# Patient Record
Sex: FEMALE | URBAN - METROPOLITAN AREA
[De-identification: names, ages, dates, MRNs, and addresses within clinical notes are randomized per-mention and may not be internally consistent; named-entity substitution may affect disease eponyms.]

---

## 2017-09-12 ENCOUNTER — TRANSFERRED RECORDS (OUTPATIENT)
Dept: HEALTH INFORMATION MANAGEMENT | Facility: CLINIC | Age: 62
End: 2017-09-12

## 2018-02-22 ENCOUNTER — TRANSFERRED RECORDS (OUTPATIENT)
Dept: HEALTH INFORMATION MANAGEMENT | Facility: CLINIC | Age: 63
End: 2018-02-22

## 2018-05-25 ENCOUNTER — TRANSFERRED RECORDS (OUTPATIENT)
Dept: HEALTH INFORMATION MANAGEMENT | Facility: CLINIC | Age: 63
End: 2018-05-25

## 2018-10-10 ENCOUNTER — TELEPHONE (OUTPATIENT)
Dept: DERMATOLOGY | Facility: CLINIC | Age: 63
End: 2018-10-10

## 2018-10-10 NOTE — TELEPHONE ENCOUNTER
Left message for Deisy reminding of appointment date and time. Asked that they bring an updated list of medications and any records pertaining to this visit.     Clinic number provided to call back in case this appointment needs to be canceled.

## 2018-10-16 ENCOUNTER — TELEPHONE (OUTPATIENT)
Dept: DERMATOLOGY | Facility: CLINIC | Age: 63
End: 2018-10-16

## 2018-10-16 ENCOUNTER — OFFICE VISIT (OUTPATIENT)
Dept: DERMATOLOGY | Facility: CLINIC | Age: 63
End: 2018-10-16
Payer: COMMERCIAL

## 2018-10-16 DIAGNOSIS — L12.0 BULLOUS PEMPHIGOID (H): Primary | ICD-10-CM

## 2018-10-16 RX ORDER — BECLOMETHASONE DIPROPIONATE HFA 80 UG/1
AEROSOL, METERED RESPIRATORY (INHALATION)
COMMUNITY
Start: 2018-10-10

## 2018-10-16 RX ORDER — BUDESONIDE AND FORMOTEROL FUMARATE DIHYDRATE 160; 4.5 UG/1; UG/1
2 AEROSOL RESPIRATORY (INHALATION)
COMMUNITY
Start: 2018-10-02

## 2018-10-16 RX ORDER — MINOCYCLINE HYDROCHLORIDE 100 MG/1
100 TABLET ORAL 2 TIMES DAILY
Qty: 180 TABLET | Refills: 1 | Status: SHIPPED | OUTPATIENT
Start: 2018-10-16

## 2018-10-16 RX ORDER — CLOBETASOL PROPIONATE 0.5 MG/G
CREAM TOPICAL
COMMUNITY
Start: 2018-09-12

## 2018-10-16 RX ORDER — TRIAMCINOLONE ACETONIDE 1 MG/G
OINTMENT TOPICAL
Qty: 453.6 G | Refills: 1 | Status: SHIPPED | OUTPATIENT
Start: 2018-10-16 | End: 2019-05-14

## 2018-10-16 RX ORDER — ALBUTEROL SULFATE 90 UG/1
1 AEROSOL, METERED RESPIRATORY (INHALATION)
COMMUNITY
Start: 2018-10-02

## 2018-10-16 RX ORDER — TRIAMCINOLONE ACETONIDE 1 MG/G
CREAM TOPICAL
COMMUNITY
Start: 2018-05-08 | End: 2018-10-18

## 2018-10-16 ASSESSMENT — PAIN SCALES - GENERAL: PAINLEVEL: NO PAIN (0)

## 2018-10-16 NOTE — PROGRESS NOTES
"[facility] Dermatology Note      Dermatology Problem List:  1. Bullous pemphigoid  -Dx established around 2016/2018  -Has required prednisone and has been on MMF as high as 1000mg BID  -Current:   -Minocycline 100mg BID   -Nicotinamide 500mg TID   -Awaiting outside records from [facility]    Encounter Date: Oct 16, 2018    CC:  Chief Complaint   Patient presents with     Derm Problem     Deisy is here for a second opinion regarding her skin.          History of Present Illness:  Ms. Deisy Miller is a 63 year old female who presents in consultation from Dr. Amy Woodward for blistering disorder.     The pt states about 5 years ago, she developed an itchy rash on her legs. This would come and go, and she did nto think anything of it. A little over a year ago, she developed a new rash on her abdomen and thighs. Her PCP referred her to Dr. Mihaela Rosa at [facility] who bx'ed and made the dx of bullous pemphigoid. The pt's most recent flare was in May 2018, and her flare required a 30 days course of oral prednisone. The pt also has TAC and clobetasol topicals which she uses when her skin itches. The pt did try oral doxycyline, but she had to stop this after a few days due to stomach upset.    The pt stopped her MMF one month ago. Her skin has not flared. The pt stopped the MMF because she is concerned about MMF because in the product insert, it states it is meant from organ transplant patients. She also notes that the MMF made her feel \"different,\" but the pt could not specifically clarify what these symptoms were. Today, the pt wants to know if there are any options for her BP.    Otherwise, the pt states she feels well, and she denies any other general or skin-specific complaints at this time.     Past Medical History:   There is no problem list on file for this patient.    History reviewed. No pertinent past medical history.  History reviewed. No pertinent " surgical history.    Social History:     Family History:  History reviewed. No pertinent family history.    Medications:  Current Outpatient Prescriptions   Medication Sig Dispense Refill     albuterol (PROAIR HFA/PROVENTIL HFA/VENTOLIN HFA) 108 (90 Base) MCG/ACT inhaler Inhale 1 puff into the lungs       clobetasol (TEMOVATE) 0.05 % cream        QVAR REDIHALER 80 MCG/ACT inhaler        budesonide-formoterol (SYMBICORT) 160-4.5 MCG/ACT Inhaler Inhale 2 puffs into the lungs       triamcinolone (KENALOG) 0.1 % cream        Allergies   Allergen Reactions     Penicillins Hives         Review of Systems:  -As per HPI    Physical exam:  Vitals: There were no vitals taken for this visit.  GEN: This is a well developed, well-nourished female in no acute distress, in a pleasant mood.    SKIN: Total skin excluding the undergarment areas was performed. The exam included the head/face, neck, both arms, chest, back, abdomen, both legs, digits and/or nails.   -There are approximately 1-2 dozen hyperpigmented macules on the trunk, particularly on the left flank and back. There is not redness or edema of the skin, and there are not any bullae noted on exam today.  -No other lesions of concern on areas examined.     Impression/Plan:  1. Bullous pemphigoid - new condition with uncertain prognosis (currently mild, has been worse)    At this time, the pt has been off MMF for one month, and she has not had a flare. Given that the pt is concerned about being on MMF, we think it is worthwhile doing another course of an oral tetracycline. Given that she did not tolerate doxycyline (GI), we will start minocycline and nicotinamide.     Discussed with pt that if her dz flares on minocycline, she may need to be back on MMF and that this medication is often well tolerated when it is needed    Will start minocycline 100mg BID and nicotinamide 500mg TID. Encouraged the pt to eat fermented foods daily. Also refilled TAC 0.1% ointment    Will  request outside records from [facility]. Expalined pt can F/U with us or with Dr. Rosa. Encouraged the pt to call us if she flares.    Can also supplement topically with clobetasol or triamcinolone ointment for small foci of involvement.    Follow-up in 6 months    Please send a copy of this note to Dr. Mihaela Rosa at [facility] in [place], [place]    Darryl Taylor MD  PGY-4, Dermatology    Patient discussed and staffed with Dr. Sargent    Staff Physician Comments:   I saw and evaluated the patient with the resident and I agree with the assessment and plan.  I was present for the examination.    Juan Miguel Sargent MD  Dermatology Staff Physician  , Department of Dermatology

## 2018-10-16 NOTE — PATIENT INSTRUCTIONS
1. Take minocycline 100mg twice a day    2. Take nicotinamide 500mg three times a day (you can find this in the vitamin section of the pharmacy)    3. Eat fermented foods like yogurt, danyelle-chi, sauerkraut, or pickles every day to promote good gut health

## 2018-10-16 NOTE — TELEPHONE ENCOUNTER
I called and spoke with the pharmacy and Dr. Darryl ZEPEDA, a new order is going to be sent over with the exact amount of days the rx should last.    KARYNA Díaz

## 2018-10-16 NOTE — NURSING NOTE
Dermatology Rooming Note    Deisy Miller's goals for this visit include:   Chief Complaint   Patient presents with     Derm Problem     Deisy is here for a second opinion regarding her skin.      Lorenza Alfredo LPN

## 2018-10-16 NOTE — TELEPHONE ENCOUNTER
Health Call Center    Phone Message    May a detailed message be left on voicemail: yes    Reason for Call: Medication Question or concern regarding medication   Prescription Clarification  Name of Medication: triamcinolone (KENALOG) 0.1 % ointment  Prescribing Provider: Dr. Sargent   Pharmacy: [address]   What on the order needs clarification? With orders this large it needs to specify how long the ointment is supposed to last. Please fax over a new order.     Action Taken: Message routed to:  [facility] Dermatology

## 2018-10-16 NOTE — LETTER
"10/16/2018       RE: Deisy Miller  [address]  [address] [place] [id number]     Dear Colleague,    Thank you for referring your patient, Deisy Miller, to the [facility] DERMATOLOGY at [facility]. Please see a copy of my visit note below.    [facility] Dermatology Note      Dermatology Problem List:  1. Bullous pemphigoid  -Dx established around 2016/2018  -Has required prednisone and has been on MMF as high as 1000mg BID  -Current:   -Minocycline 100mg BID   -Nicotinamide 500mg TID   -Awaiting outside records from [facility]    Encounter Date: Oct 16, 2018    CC:  Chief Complaint   Patient presents with     Derm Problem     Deisy is here for a second opinion regarding her skin.          History of Present Illness:  Ms. Deisy Miller is a 63 year old female who presents in consultation from Dr. Amy Woodward for blistering disorder.     The pt states about 5 years ago, she developed an itchy rash on her legs. This would come and go, and she did nto think anything of it. A little over a year ago, she developed a new rash on her abdomen and thighs. Her PCP referred her to Dr. Mihaela Rosa at [facility] who bx'ed and made the dx of bullous pemphigoid. The pt's most recent flare was in May 2018, and her flare required a 30 days course of oral prednisone. The pt also has TAC and clobetasol topicals which she uses when her skin itches. The pt did try oral doxycyline, but she had to stop this after a few days due to stomach upset.    The pt stopped her MMF one month ago. Her skin has not flared. The pt stopped the MMF because she is concerned about MMF because in the product insert, it states it is meant from organ transplant patients. She also notes that the MMF made her feel \"different,\" but the pt could not specifically clarify what these symptoms were. Today, the pt wants to know if there are any options for her " BP.    Otherwise, the pt states she feels well, and she denies any other general or skin-specific complaints at this time.     Past Medical History:   There is no problem list on file for this patient.    History reviewed. No pertinent past medical history.  History reviewed. No pertinent surgical history.    Social History:     Family History:  History reviewed. No pertinent family history.    Medications:  Current Outpatient Prescriptions   Medication Sig Dispense Refill     albuterol (PROAIR HFA/PROVENTIL HFA/VENTOLIN HFA) 108 (90 Base) MCG/ACT inhaler Inhale 1 puff into the lungs       clobetasol (TEMOVATE) 0.05 % cream        QVAR REDIHALER 80 MCG/ACT inhaler        budesonide-formoterol (SYMBICORT) 160-4.5 MCG/ACT Inhaler Inhale 2 puffs into the lungs       triamcinolone (KENALOG) 0.1 % cream        Allergies   Allergen Reactions     Penicillins Hives         Review of Systems:  -As per HPI    Physical exam:  Vitals: There were no vitals taken for this visit.  GEN: This is a well developed, well-nourished female in no acute distress, in a pleasant mood.    SKIN: Total skin excluding the undergarment areas was performed. The exam included the head/face, neck, both arms, chest, back, abdomen, both legs, digits and/or nails.   -There are approximately 1-2 dozen hyperpigmented macules on the trunk, particularly on the left flank and back. There is not redness or edema of the skin, and there are not any bullae noted on exam today.  -No other lesions of concern on areas examined.     Impression/Plan:  1. Bullous pemphigoid - new condition with uncertain prognosis (currently mild, has been worse)    At this time, the pt has been off MMF for one month, and she has not had a flare. Given that the pt is concerned about being on MMF, we think it is worthwhile doing another course of an oral tetracycline. Given that she did not tolerate doxycyline (GI), we will start minocycline and nicotinamide.     Discussed with pt  that if her dz flares on minocycline, she may need to be back on MMF and that this medication is often well tolerated when it is needed    Will start minocycline 100mg BID and nicotinamide 500mg TID. Encouraged the pt to eat fermented foods daily. Also refilled TAC 0.1% ointment    Will request outside records from [facility]. Expalined pt can F/U with us or with Dr. Rosa. Encouraged the pt to call us if she flares.    Can also supplement topically with clobetasol or triamcinolone ointment for small foci of involvement.    Follow-up in 6 months    Please send a copy of this note to Dr. Mihaela Rosa at [facility] in [place], [place]    Darryl Taylor MD  PGY-4, Dermatology    Patient discussed and staffed with Dr. Sargent    Staff Physician Comments:   I saw and evaluated the patient with the resident and I agree with the assessment and plan.  I was present for the examination.    Juan Miguel Sargent MD  Dermatology Staff Physician  , Department of Dermatology

## 2018-10-18 DIAGNOSIS — L12.0 BULLOUS PEMPHIGOID (H): Primary | ICD-10-CM

## 2018-10-18 RX ORDER — TRIAMCINOLONE ACETONIDE 1 MG/G
CREAM TOPICAL
Qty: 453.6 G | Refills: 2 | Status: SHIPPED | OUTPATIENT
Start: 2018-10-18

## 2019-02-27 ENCOUNTER — DOCUMENTATION ONLY (OUTPATIENT)
Dept: CARE COORDINATION | Facility: CLINIC | Age: 64
End: 2019-02-27

## 2019-05-14 ENCOUNTER — TELEPHONE (OUTPATIENT)
Dept: DERMATOLOGY | Facility: CLINIC | Age: 64
End: 2019-05-14

## 2019-05-14 ENCOUNTER — OFFICE VISIT (OUTPATIENT)
Dept: DERMATOLOGY | Facility: CLINIC | Age: 64
End: 2019-05-14
Payer: COMMERCIAL

## 2019-05-14 DIAGNOSIS — L12.0 BULLOUS PEMPHIGOID (H): ICD-10-CM

## 2019-05-14 RX ORDER — CLOBETASOL PROPIONATE 0.5 MG/G
OINTMENT TOPICAL 2 TIMES DAILY
Qty: 60 G | Refills: 11 | Status: SHIPPED | OUTPATIENT
Start: 2019-05-14

## 2019-05-14 RX ORDER — TRIAMCINOLONE ACETONIDE 1 MG/G
OINTMENT TOPICAL
Qty: 453.6 G | Refills: 1 | Status: SHIPPED | OUTPATIENT
Start: 2019-05-14

## 2019-05-14 ASSESSMENT — PAIN SCALES - GENERAL: PAINLEVEL: NO PAIN (0)

## 2019-05-14 NOTE — LETTER
5/14/2019       RE: Deisy Miller  [address]  [address] [place] [id number]     Dear Colleague,    Thank you for referring your patient, Deisy Miller, to the [facility] DERMATOLOGY at [facility]. Please see a copy of my visit note below.    [facility] Dermatology Note      Dermatology Problem List:  1. Bullous pemphigoid  - Dx established around 2016/2018  - Has required prednisone and has been on MMF as high as 1000mg BID  - Off treatment as of 9/2018    Encounter Date: May 14, 2019    CC:  Chief Complaint   Patient presents with     Derm Problem     Deisy is here today for Bullous Pemphigoid follow up. Patient states she is not using any medications at this time and has not had any flare ups or blistering lesions. Uses clobatasol PRN for any itchy or irritates areas.          History of Present Illness:  Ms. Deisy Miller is a 63 year old female who presents in follow-up for bullous pemphigoid.  Last seen 10/2018.    She had previously been on MMF for 1 year with history of BP from [facility] (with DIF with linear IgG and C3 and  level of 64). She was prescribed minocycline/nicotinamide at last visit and did not take this.  However, she has not had any breakouts despite no treatment.  If she does have an itchy spot, she uses clobetasol immediately and a lesion never occurs.    Previous [facility] records were reviewed at this visit.    Otherwise, the pt states she feels well, and she denies any other general or skin-specific complaints at this time.     Past Medical History:   There is no problem list on file for this patient.    No past medical history on file.  No past surgical history on file.    Social History:     Family History:  No family history on file.    Medications:  Current Outpatient Medications   Medication Sig Dispense Refill     albuterol (PROAIR HFA/PROVENTIL HFA/VENTOLIN HFA) 108 (90 Base) MCG/ACT  inhaler Inhale 1 puff into the lungs       budesonide-formoterol (SYMBICORT) 160-4.5 MCG/ACT Inhaler Inhale 2 puffs into the lungs       clobetasol (TEMOVATE) 0.05 % cream        minocycline (DYNACIN) 100 MG tablet Take 1 tablet (100 mg) by mouth 2 times daily 180 tablet 1     QVAR REDIHALER 80 MCG/ACT inhaler        triamcinolone (KENALOG) 0.1 % cream Apply up to twice a day to itch rash. Do not apply to face, groin, or buttock. 453.6 g 2     triamcinolone (KENALOG) 0.1 % ointment Apply up to twice a day as needed for rash when present. Do not apply to face, groin, or buttocks 453.6 g 1     Allergies   Allergen Reactions     Penicillins Hives         Review of Systems:  -As per HPI    Physical exam:  Vitals: There were no vitals taken for this visit.  GEN: This is a well developed, well-nourished female in no acute distress, in a pleasant mood.    SKIN: Exam of face, neck, chest, back, bilateral arms, bilateral legs  -No other lesions of concern on areas examined.     Impression/Plan:  1. Bullous pemphigoid--with appropriate H&E, DIF and +.  However, with peculiar remission at this point.      Hold orals    Continue topicals as needed  Follow-up if flares    Follow-up in prn    Lux Fitch MD  PGY-3 Dermatology  (p) [phone]    Patient discussed and staffed with Dr. Sargent    Staff Physician Comments:   I saw and evaluated the patient with the resident and I agree with the assessment and plan.  I was present for the examination.    Juan Miguel Sargent MD  Dermatology Staff Physician  , Department of Dermatology

## 2019-05-14 NOTE — TELEPHONE ENCOUNTER
[facility] Call Center    Phone Message    May a detailed message be left on voicemail: yes    Reason for Call: Medication Question or concern regarding medication   Prescription Clarification  Name of Medication: CLOBETASOL, TRIAMCINOLONE  Prescribing Provider: ERLIN   Pharmacy: [facility] - [address]   What on the order needs clarification? Pharmacy is requesting for a day supply to be sent over for both these rx.   For the Triamcinolone, pharmacy is requesting for a new rx script to be faxed over for 454 instead of 453.6.     Action Taken: Message routed to:  [facility] derm

## 2019-05-14 NOTE — NURSING NOTE
Chief Complaint   Patient presents with     Derm Problem     Deisy is here today for Bullous Pemphigoid follow up. Patient states she is not using any medications at this time and has not had any flare ups or blistering lesions. Uses clobatasol PRN for any itchy or irritates areas.      Ophelia Bustillos LPN

## 2019-05-14 NOTE — PROGRESS NOTES
[facility] Dermatology Note      Dermatology Problem List:  1. Bullous pemphigoid  - Dx established around 2016/2018  - Has required prednisone and has been on MMF as high as 1000mg BID  - Off treatment as of 9/2018    Encounter Date: May 14, 2019    CC:  Chief Complaint   Patient presents with     Derm Problem     Deisy is here today for Bullous Pemphigoid follow up. Patient states she is not using any medications at this time and has not had any flare ups or blistering lesions. Uses clobatasol PRN for any itchy or irritates areas.          History of Present Illness:  Ms. Deisy Miller is a 63 year old female who presents in follow-up for bullous pemphigoid.  Last seen 10/2018.    She had previously been on MMF for 1 year with history of BP from [facility] (with DIF with linear IgG and C3 and  level of 64). She was prescribed minocycline/nicotinamide at last visit and did not take this.  However, she has not had any breakouts despite no treatment.  If she does have an itchy spot, she uses clobetasol immediately and a lesion never occurs.    Previous [facility] records were reviewed at this visit.    Otherwise, the pt states she feels well, and she denies any other general or skin-specific complaints at this time.     Past Medical History:   There is no problem list on file for this patient.    No past medical history on file.  No past surgical history on file.    Social History:     Family History:  No family history on file.    Medications:  Current Outpatient Medications   Medication Sig Dispense Refill     albuterol (PROAIR HFA/PROVENTIL HFA/VENTOLIN HFA) 108 (90 Base) MCG/ACT inhaler Inhale 1 puff into the lungs       budesonide-formoterol (SYMBICORT) 160-4.5 MCG/ACT Inhaler Inhale 2 puffs into the lungs       clobetasol (TEMOVATE) 0.05 % cream        minocycline (DYNACIN) 100 MG tablet Take 1 tablet (100 mg) by mouth 2 times daily 180 tablet 1     QVAR REDIHALER 80  MCG/ACT inhaler        triamcinolone (KENALOG) 0.1 % cream Apply up to twice a day to itch rash. Do not apply to face, groin, or buttock. 453.6 g 2     triamcinolone (KENALOG) 0.1 % ointment Apply up to twice a day as needed for rash when present. Do not apply to face, groin, or buttocks 453.6 g 1     Allergies   Allergen Reactions     Penicillins Hives         Review of Systems:  -As per HPI    Physical exam:  Vitals: There were no vitals taken for this visit.  GEN: This is a well developed, well-nourished female in no acute distress, in a pleasant mood.    SKIN: Exam of face, neck, chest, back, bilateral arms, bilateral legs  -No other lesions of concern on areas examined.     Impression/Plan:  1. Bullous pemphigoid--with appropriate H&E, DIF and +.  However, with peculiar remission at this point.      Hold orals    Continue topicals as needed  Follow-up if flares    Follow-up in prn    Lux Fitch MD  PGY-3 Dermatology  (p) [phone]    Patient discussed and staffed with Dr. Sargent    Staff Physician Comments:   I saw and evaluated the patient with the resident and I agree with the assessment and plan.  I was present for the examination.    Juan Miguel Sargent MD  Dermatology Staff Physician  , Department of Dermatology

## 2020-06-02 DIAGNOSIS — L12.0 BULLOUS PEMPHIGOID (H): ICD-10-CM

## 2020-06-04 RX ORDER — CLOBETASOL PROPIONATE 0.5 MG/G
OINTMENT TOPICAL
Qty: 30 G | Refills: 23 | OUTPATIENT
Start: 2020-06-04

## 2020-06-04 NOTE — TELEPHONE ENCOUNTER
Last Clinic Visit:  5/14/19  NV:  NONE    Scheduling has been notified to contact the pt for appointment.

## 2021-03-08 ENCOUNTER — TELEPHONE (OUTPATIENT)
Dept: DERMATOLOGY | Facility: CLINIC | Age: 66
End: 2021-03-08

## 2021-03-08 NOTE — TELEPHONE ENCOUNTER
[facility] Call Center    Phone Message    May a detailed message be left on voicemail: yes     Reason for Call: Other: Pt called wanting to know if it would be safe to get a Covid -19 vaccine shot since she was Dx with Bullous Pemoghigoid 2 years ago. She hasn't had a breakout in 2 years! Please call Pt back to discuss, Thank You    Action Taken: Message routed to:  [facility] Derm    Travel Screening: Not Applicable

## 2021-03-08 NOTE — TELEPHONE ENCOUNTER
Informed the patient that she is okay to get the vaccine.    Kassandar Hernandez, [facility]

## 2023-05-26 NOTE — PROGRESS NOTES
Received message from pharmacy because TAC 0.1% need a specific number of days for the Rx. I have re-sent the Rx and explicitly stated that the 454g jar represents a 90 day supply.   THIEN Concepcion